# Patient Record
Sex: FEMALE | Race: WHITE | URBAN - NONMETROPOLITAN AREA
[De-identification: names, ages, dates, MRNs, and addresses within clinical notes are randomized per-mention and may not be internally consistent; named-entity substitution may affect disease eponyms.]

---

## 2024-04-22 ENCOUNTER — NURSE ONLY (OUTPATIENT)
Age: 59
End: 2024-04-22

## 2024-04-22 LAB
ALBUMIN SERPL BCG-MCNC: 4.9 G/DL (ref 3.5–5.1)
ALP SERPL-CCNC: 108 U/L (ref 38–126)
ALT SERPL W/O P-5'-P-CCNC: 35 U/L (ref 11–66)
ANION GAP SERPL CALC-SCNC: 14 MEQ/L (ref 8–16)
AST SERPL-CCNC: 26 U/L (ref 5–40)
BASOPHILS ABSOLUTE: 0 THOU/MM3 (ref 0–0.1)
BASOPHILS NFR BLD AUTO: 0.4 %
BILIRUB SERPL-MCNC: 0.3 MG/DL (ref 0.3–1.2)
BUN SERPL-MCNC: 11 MG/DL (ref 7–22)
CALCIUM SERPL-MCNC: 9.4 MG/DL (ref 8.5–10.5)
CHLORIDE SERPL-SCNC: 103 MEQ/L (ref 98–111)
CHOLEST SERPL-MCNC: 242 MG/DL (ref 100–199)
CO2 SERPL-SCNC: 26 MEQ/L (ref 23–33)
CREAT SERPL-MCNC: 0.6 MG/DL (ref 0.4–1.2)
DEPRECATED RDW RBC AUTO: 41.7 FL (ref 35–45)
EOSINOPHIL NFR BLD AUTO: 2.7 %
EOSINOPHILS ABSOLUTE: 0.2 THOU/MM3 (ref 0–0.4)
ERYTHROCYTE [DISTWIDTH] IN BLOOD BY AUTOMATED COUNT: 12.7 % (ref 11.5–14.5)
GFR SERPL CREATININE-BSD FRML MDRD: > 90 ML/MIN/1.73M2
GLUCOSE SERPL-MCNC: 119 MG/DL (ref 70–108)
HCT VFR BLD AUTO: 43.3 % (ref 37–47)
HDLC SERPL-MCNC: 46 MG/DL
HGB BLD-MCNC: 14.3 GM/DL (ref 12–16)
IMM GRANULOCYTES # BLD AUTO: 0.02 THOU/MM3 (ref 0–0.07)
IMM GRANULOCYTES NFR BLD AUTO: 0.3 %
LDLC SERPL CALC-MCNC: 154 MG/DL
LYMPHOCYTES ABSOLUTE: 2.5 THOU/MM3 (ref 1–4.8)
LYMPHOCYTES NFR BLD AUTO: 32.6 %
MCH RBC QN AUTO: 30.1 PG (ref 26–33)
MCHC RBC AUTO-ENTMCNC: 33 GM/DL (ref 32.2–35.5)
MCV RBC AUTO: 91.2 FL (ref 81–99)
MONOCYTES ABSOLUTE: 0.6 THOU/MM3 (ref 0.4–1.3)
MONOCYTES NFR BLD AUTO: 7.2 %
NEUTROPHILS NFR BLD AUTO: 56.8 %
NRBC BLD AUTO-RTO: 0 /100 WBC
PLATELET # BLD AUTO: 300 THOU/MM3 (ref 130–400)
PMV BLD AUTO: 9.9 FL (ref 9.4–12.4)
POTASSIUM SERPL-SCNC: 4 MEQ/L (ref 3.5–5.2)
PROT SERPL-MCNC: 7.6 G/DL (ref 6.1–8)
RBC # BLD AUTO: 4.75 MILL/MM3 (ref 4.2–5.4)
SEGMENTED NEUTROPHILS ABSOLUTE COUNT: 4.4 THOU/MM3 (ref 1.8–7.7)
SODIUM SERPL-SCNC: 143 MEQ/L (ref 135–145)
TRIGL SERPL-MCNC: 211 MG/DL (ref 0–199)
TSH SERPL DL<=0.005 MIU/L-ACNC: 1.79 UIU/ML (ref 0.4–4.2)
WBC # BLD AUTO: 7.7 THOU/MM3 (ref 4.8–10.8)

## 2024-04-24 LAB
DEPRECATED MEAN GLUCOSE BLD GHB EST-ACNC: 114 MG/DL (ref 70–126)
HBA1C MFR BLD HPLC: 5.8 % (ref 4.4–6.4)

## 2024-09-03 ENCOUNTER — LAB (OUTPATIENT)
Age: 59
End: 2024-09-03

## 2024-09-03 LAB
CRP SERPL-MCNC: 0.47 MG/DL (ref 0–1)
ERYTHROCYTE [SEDIMENTATION RATE] IN BLOOD BY WESTERGREN METHOD: 50 MM/HR (ref 0–20)
T4 FREE SERPL-MCNC: 1.73 NG/DL (ref 0.93–1.68)
TSH SERPL DL<=0.005 MIU/L-ACNC: < 0.005 UIU/ML (ref 0.4–4.2)

## 2024-09-06 LAB — THYROPEROXIDASE AB SERPL IA-ACNC: 12 IU/ML (ref 0–25)

## 2024-09-18 ENCOUNTER — LAB (OUTPATIENT)
Age: 59
End: 2024-09-18

## 2024-09-20 LAB — THYROPEROXIDASE AB SERPL IA-ACNC: 13 IU/ML (ref 0–25)

## 2024-11-21 ENCOUNTER — LAB (OUTPATIENT)
Age: 59
End: 2024-11-21

## 2024-11-21 LAB
BASOPHILS ABSOLUTE: 0 THOU/MM3 (ref 0–0.1)
BASOPHILS NFR BLD AUTO: 0.3 %
CRP SERPL-MCNC: < 0.3 MG/DL (ref 0–1)
DEPRECATED RDW RBC AUTO: 43.9 FL (ref 35–45)
EOSINOPHIL NFR BLD AUTO: 2 %
EOSINOPHILS ABSOLUTE: 0.2 THOU/MM3 (ref 0–0.4)
ERYTHROCYTE [DISTWIDTH] IN BLOOD BY AUTOMATED COUNT: 13.6 % (ref 11.5–14.5)
HCT VFR BLD AUTO: 43.1 % (ref 37–47)
HGB BLD-MCNC: 14.1 GM/DL (ref 12–16)
IMM GRANULOCYTES # BLD AUTO: 0.03 THOU/MM3 (ref 0–0.07)
IMM GRANULOCYTES NFR BLD AUTO: 0.3 %
LYMPHOCYTES ABSOLUTE: 3.2 THOU/MM3 (ref 1–4.8)
LYMPHOCYTES NFR BLD AUTO: 33.9 %
MCH RBC QN AUTO: 29.1 PG (ref 26–33)
MCHC RBC AUTO-ENTMCNC: 32.7 GM/DL (ref 32.2–35.5)
MCV RBC AUTO: 89 FL (ref 81–99)
MONOCYTES ABSOLUTE: 0.6 THOU/MM3 (ref 0.4–1.3)
MONOCYTES NFR BLD AUTO: 6 %
NEUTROPHILS ABSOLUTE: 5.3 THOU/MM3 (ref 1.8–7.7)
NEUTROPHILS NFR BLD AUTO: 57.5 %
NRBC BLD AUTO-RTO: 0 /100 WBC
PLATELET # BLD AUTO: 326 THOU/MM3 (ref 130–400)
PMV BLD AUTO: 10.3 FL (ref 9.4–12.4)
RBC # BLD AUTO: 4.84 MILL/MM3 (ref 4.2–5.4)
T3 TOTAL: 155 NG/DL (ref 80–200)
T3FREE SERPL-MCNC: 3.3 PG/ML (ref 2–4.4)
T4 FREE SERPL-MCNC: 0.79 NG/DL (ref 0.93–1.68)
T4 SERPL-MCNC: 6.9 UG/DL (ref 4.5–11.7)
TSH SERPL DL<=0.005 MIU/L-ACNC: 1.76 UIU/ML (ref 0.4–4.2)
WBC # BLD AUTO: 9.3 THOU/MM3 (ref 4.8–10.8)

## 2024-12-02 ENCOUNTER — OFFICE VISIT (OUTPATIENT)
Age: 59
End: 2024-12-02
Payer: COMMERCIAL

## 2024-12-02 VITALS
WEIGHT: 172.8 LBS | BODY MASS INDEX: 30.62 KG/M2 | HEART RATE: 89 BPM | SYSTOLIC BLOOD PRESSURE: 142 MMHG | HEIGHT: 63 IN | DIASTOLIC BLOOD PRESSURE: 86 MMHG

## 2024-12-02 DIAGNOSIS — R94.6 ABNORMAL THYROID FUNCTION TEST: Primary | ICD-10-CM

## 2024-12-02 DIAGNOSIS — E23.6 EMPTY SELLA (HCC): ICD-10-CM

## 2024-12-02 DIAGNOSIS — E05.00 GRAVES DISEASE: ICD-10-CM

## 2024-12-02 PROCEDURE — 99204 OFFICE O/P NEW MOD 45 MIN: CPT | Performed by: INTERNAL MEDICINE

## 2024-12-02 RX ORDER — METOPROLOL SUCCINATE 25 MG/1
25 TABLET, EXTENDED RELEASE ORAL DAILY
COMMUNITY
Start: 2024-11-06

## 2024-12-02 RX ORDER — METHIMAZOLE 5 MG/1
5 TABLET ORAL DAILY
COMMUNITY
Start: 2024-10-31

## 2024-12-02 RX ORDER — AMITRIPTYLINE HYDROCHLORIDE 10 MG/1
TABLET ORAL
COMMUNITY
Start: 2024-11-20

## 2024-12-02 NOTE — PROGRESS NOTES
[facility] [place] [facility]  [facility] ENDOCRINOLOGY  [address]  [place] [id number]  Dept: [phone]  Loc: [phone]     Visit Date: 2024    Gianna Boland is a 59 y.o. female who presents today for:  Chief Complaint   Patient presents with    Hyperthyroidism            Subjective:  History of Present Illness       Gianna Boland is a 59 y.o. , female who comes for initial visit for abnormal thyroid function test.    Lopez Kramer MD  Gianna Boland was diagnosed with abnormal thyroid function tests 5 months ago consisting of low TSH and elevated free T4.  She subsequently underwent additional testing which showed elevated thyroid-stimulating immunoglobulin, consistent then with a diagnosis of Graves' disease.  The patient was placed on Tapazole 5 mg daily and repeat testing 3 weeks ago showed normalization of TSH, low free T4 and a normal free T3.  The patient had a thyroid scan with uptake on 2024 with a 24-hour uptake coming back slightly elevated at 28% but there was normal distribution of the radiopharmaceutical agent in the thyroid gland.  She had an ultrasound of the thyroid gland 2024 with the finding of a 4.7 x 2.7 x 1.7 cm nodule in the right lobe.  Of note, the patient has been having right ear pain which radiates up the scalp and to work this up she had an MRI of the head on 2024.  This study showed partially empty sella which has not been worked up.  Past Medical History:   Diagnosis Date    MVP (mitral valve prolapse)       Past Surgical History:   Procedure Laterality Date     SECTION  ,     DILATION AND CURETTAGE OF UTERUS  11    ENDOMETRIAL ABLATION  11    OVARIAN CYST REMOVAL  1985    TONSILLECTOMY  1970       Family History   Problem Relation Age of Onset    High Blood Pressure Mother     High Blood Pressure Father      Social History     Tobacco Use    Smoking status: Never    Smokeless tobacco: Never

## 2024-12-03 ENCOUNTER — LAB (OUTPATIENT)
Age: 59
End: 2024-12-03

## 2024-12-03 DIAGNOSIS — E23.6 EMPTY SELLA (HCC): ICD-10-CM

## 2024-12-03 DIAGNOSIS — R94.6 ABNORMAL THYROID FUNCTION TEST: ICD-10-CM

## 2024-12-03 LAB
CORTIS SERPL-MCNC: 9.35 UG/DL
CORTISOL COLLECTION INFO: NORMAL
FOLLICLE STIMULATING HORMONE: 62.9 MIU/ML
LUTEINIZING HORMONE: 39.3 MIU/ML (ref 1.7–8.6)
PROLACTIN SERPL-MCNC: 8.7 NG/ML
T3FREE SERPL-MCNC: 3.8 PG/ML (ref 2–4.4)
T4 FREE SERPL-MCNC: 0.89 NG/DL (ref 0.93–1.68)
TSH SERPL DL<=0.005 MIU/L-ACNC: 2.25 UIU/ML (ref 0.4–4.2)

## 2024-12-04 LAB — ACTH PLAS-MCNC: 9.6 PG/ML (ref 7.2–63.3)

## 2024-12-05 LAB
IGF-I SERPL-MCNC: 173 NG/ML (ref 43–241)
IGF-I Z-SCORE SERPL: 1
TSI SER-ACNC: 0.23 IU/L

## 2024-12-09 ENCOUNTER — TELEPHONE (OUTPATIENT)
Age: 59
End: 2024-12-09

## 2024-12-09 NOTE — TELEPHONE ENCOUNTER
----- Message from Dr. Navin Cook MD sent at 12/7/2024  4:11 PM EST -----  Acceptable thyroid labs.  Get with me to perform a formal ACTH stimulation test.

## 2024-12-19 ENCOUNTER — LAB (OUTPATIENT)
Age: 59
End: 2024-12-19

## 2024-12-19 LAB
CORTIS SERPL-MCNC: 12.28 UG/DL
CORTISOL COLLECTION INFO: NORMAL

## 2024-12-21 LAB
IGF-I SERPL-MCNC: 140 NG/ML (ref 43–241)
IGF-I Z-SCORE SERPL: 0.5

## 2024-12-26 ENCOUNTER — TELEPHONE (OUTPATIENT)
Age: 59
End: 2024-12-26

## 2024-12-26 NOTE — TELEPHONE ENCOUNTER
----- Message from Dr. Navin Cook MD sent at 12/22/2024  4:19 PM EST -----  IGF-I is normal, implying that the growth hormone axis is normal.  Cortisol level is indeterminate.  Lets wait for the final ACTH stimulation test.

## 2024-12-31 ENCOUNTER — HOSPITAL ENCOUNTER (OUTPATIENT)
Dept: NURSING | Age: 59
Discharge: HOME | End: 2024-12-31
Payer: COMMERCIAL

## 2024-12-31 VITALS
TEMPERATURE: 97 F | RESPIRATION RATE: 18 BRPM | HEART RATE: 73 BPM | HEIGHT: 63 IN | OXYGEN SATURATION: 98 % | DIASTOLIC BLOOD PRESSURE: 80 MMHG | BODY MASS INDEX: 30.48 KG/M2 | SYSTOLIC BLOOD PRESSURE: 162 MMHG | WEIGHT: 172 LBS

## 2024-12-31 LAB
CORTIS SERPL-MCNC: 22.55 UG/DL
CORTIS SERPL-MCNC: 25.94 UG/DL
CORTIS SERPL-MCNC: 9.89 UG/DL
CORTISOL COLLECTION INFO: NORMAL

## 2024-12-31 PROCEDURE — 82024 ASSAY OF ACTH: CPT

## 2024-12-31 PROCEDURE — 96374 THER/PROPH/DIAG INJ IV PUSH: CPT

## 2024-12-31 PROCEDURE — 82533 TOTAL CORTISOL: CPT

## 2024-12-31 PROCEDURE — 6360000002 HC RX W HCPCS: Performed by: INTERNAL MEDICINE

## 2024-12-31 PROCEDURE — 99213 OFFICE O/P EST LOW 20 MIN: CPT

## 2024-12-31 PROCEDURE — 80400 ACTH STIMULATION PANEL: CPT

## 2024-12-31 RX ORDER — COSYNTROPIN 0.25 MG/ML
0.25 INJECTION, POWDER, FOR SOLUTION INTRAMUSCULAR; INTRAVENOUS ONCE
Status: COMPLETED | OUTPATIENT
Start: 2024-12-31 | End: 2024-12-31

## 2024-12-31 RX ADMIN — COSYNTROPIN 0.25 MG: 0.25 INJECTION, POWDER, LYOPHILIZED, FOR SOLUTION INTRAMUSCULAR; INTRAVENOUS at 08:00

## 2024-12-31 ASSESSMENT — PAIN - FUNCTIONAL ASSESSMENT: PAIN_FUNCTIONAL_ASSESSMENT: NONE - DENIES PAIN

## 2024-12-31 NOTE — PROGRESS NOTES
0721 Patient arrived to [facility] ambulatory for stimulation test.  Oriented to room and call light  PT RIGHTS AND RESPONSIBILITIES OFFERED TO PT.    0748 baseline blood work obtained and sent to lab     0800 Cortrosyn given and she denies complaints     0830 30 min post blood work obtained and sent to lab     0900 60 min post blood work obtained and sent to lab     0918 iv removed.  Discharge instructions given and explained and she denies questions.  Discharged ambulatory      _M___ Safety:       (Environmental)  [place] to environment  Ensure ID band is correct and in place/ allergy band as needed  Assess for fall risk  Initiate fall precautions as applicable (fall band, side rails, etc.)  Call light within reach  Bed in low position/ wheels locked    _M___ Pain:       Assess pain level and characteristics  Administer analgesics as ordered  Assess effectiveness of pain management and report to MD as needed    _M___ Knowledge Deficit:  Assess baseline knowledge  Provide teaching at level of understanding  Provide teaching via preferred learning method  Evaluate teaching effectiveness    _M___ Hemodynamic/Respiratory Status:       (Pre and Post Procedure Monitoring)  Assess/Monitor vital signs and LOC  Assess Baseline SpO2 prior to any sedation  Obtain weight/height  Assess vital signs/ LOC until patient meets discharge criteria  Monitor procedure site and notify MD of any issues

## 2025-01-01 LAB — ACTH PLAS-MCNC: 8 PG/ML (ref 7.2–63.3)

## 2025-02-13 ENCOUNTER — CLINICAL DOCUMENTATION (OUTPATIENT)
Age: 60
End: 2025-02-13

## 2025-02-13 DIAGNOSIS — R94.6 ABNORMAL THYROID FUNCTION TEST: Primary | ICD-10-CM

## 2025-02-17 ENCOUNTER — LAB (OUTPATIENT)
Age: 60
End: 2025-02-17

## 2025-02-17 DIAGNOSIS — R94.6 ABNORMAL THYROID FUNCTION TEST: ICD-10-CM

## 2025-02-17 LAB
T3FREE SERPL-MCNC: 3.57 PG/ML (ref 2–4.4)
T4 FREE SERPL-MCNC: 0.99 NG/DL (ref 0.92–1.68)
TSH SERPL DL<=0.005 MIU/L-ACNC: 3.1 UIU/ML (ref 0.4–4.2)

## 2025-03-05 ENCOUNTER — OFFICE VISIT (OUTPATIENT)
Age: 60
End: 2025-03-05
Payer: COMMERCIAL

## 2025-03-05 VITALS
HEIGHT: 63 IN | DIASTOLIC BLOOD PRESSURE: 102 MMHG | RESPIRATION RATE: 18 BRPM | SYSTOLIC BLOOD PRESSURE: 162 MMHG | WEIGHT: 172.4 LBS | BODY MASS INDEX: 30.55 KG/M2 | HEART RATE: 78 BPM

## 2025-03-05 DIAGNOSIS — E23.6 EMPTY SELLA: ICD-10-CM

## 2025-03-05 DIAGNOSIS — E05.90 HYPERTHYROIDISM: ICD-10-CM

## 2025-03-05 DIAGNOSIS — R03.0 ELEVATED BLOOD PRESSURE READING: Primary | ICD-10-CM

## 2025-03-05 DIAGNOSIS — E05.00 GRAVES DISEASE: ICD-10-CM

## 2025-03-05 PROCEDURE — 99215 OFFICE O/P EST HI 40 MIN: CPT | Performed by: INTERNAL MEDICINE

## 2025-03-05 RX ORDER — METOPROLOL SUCCINATE 25 MG/1
TABLET, EXTENDED RELEASE ORAL
Qty: 7 TABLET | Refills: 0 | Status: SHIPPED | OUTPATIENT
Start: 2025-03-05

## 2025-03-05 NOTE — PROGRESS NOTES
[facility] SPECIALTY  [facility] ENDOCRINOLOGY  [address]  [facility] [address]  Dept: [phone]  Loc: [phone]     Visit Date: 3/5/2025  The patient (or guardian, if applicable) and other individuals in attendance with the patient were advised that Artificial Intelligence will be utilized during this visit to record, process the conversation to generate a clinical note, and support improvement of the AI technology. The patient (or guardian, if applicable) and other individuals in attendance at the appointment consented to the use of AI, including the recording.      Gianna Boland is a 59 y.o. female who presents today for:  Chief Complaint   Patient presents with    Follow-up     Abnormal thyroid function test             Subjective:  History of Present Illness  The patient presents for evaluation of Graves' disease and elevated blood pressure.    She reports a perceived weight gain, persistent dry skin, and continued hair thinning. She does not experience heat intolerance, palpitations, tremors, or anxiety. Occasional awakenings at 3:00 or 4:00 AM are noted, after which she is not able to return to sleep. Despite an improvement in fatigue since her last visit, she still feels tired. She has a history of a small thyroid nodule, which has remained stable over the past 3 years. An ultrasound conducted this summer revealed no changes compared to the one performed 3 years ago. A previous nodule on the left side has resolved. She is currently on a daily regimen of methimazole 5 mg, having discontinued metoprolol. Her initial consultation was in 12/2024, following an abnormal thyroid level detected in 08/2024, with a T4 level of 1.78. She experienced palpitations in 08/2024, but these have since resolved.  She has been diagnosed with dry eyes by her ophthalmologist, who prescribed Systane drops and a supplement containing omega-3 and vitamins. This treatment has improved

## 2025-03-26 LAB
A/G RATIO: 1.8
ALBUMIN: 4.9 G/DL
ALP BLD-CCNC: 116 U/L
ALT SERPL-CCNC: 24 U/L
AST SERPL-CCNC: 22 U/L
BASOPHILS ABSOLUTE: 29 /ΜL
BASOPHILS RELATIVE PERCENT: 0.4 %
BILIRUB SERPL-MCNC: 0.5 MG/DL (ref 0.1–1.4)
BILIRUBIN DIRECT: 0.1 MG/DL
BILIRUBIN, INDIRECT: 0.4
EOSINOPHILS ABSOLUTE: 151 /ΜL
EOSINOPHILS RELATIVE PERCENT: 2.1 %
GLOBULIN: 2.7
HCT VFR BLD CALC: 41.7 % (ref 36–46)
HEMOGLOBIN: 14.1 G/DL (ref 12–16)
LYMPHOCYTES ABSOLUTE: 2477 /ΜL
LYMPHOCYTES RELATIVE PERCENT: 34.4 %
MCH RBC QN AUTO: 29.5 PG
MCHC RBC AUTO-ENTMCNC: 33.8 G/DL
MCV RBC AUTO: 87.2 FL
MONOCYTES ABSOLUTE: 425 /ΜL
MONOCYTES RELATIVE PERCENT: 5.9 %
NEUTROPHILS ABSOLUTE: 4118 /ΜL
NEUTROPHILS RELATIVE PERCENT: 57.2 %
PDW BLD-RTO: 12.5 %
PLATELET # BLD: 324 K/ΜL
PMV BLD AUTO: 9.8 FL
RBC # BLD: 4.78 10^6/ΜL
T3 FREE: 3.1
T4 FREE: 1.1
TOTAL PROTEIN: 7.6 G/DL (ref 6.4–8.2)
TSH SERPL DL<=0.05 MIU/L-ACNC: 2.41 UIU/ML
WBC # BLD: 7.2 10^3/ML

## 2025-03-28 ENCOUNTER — CLINICAL DOCUMENTATION (OUTPATIENT)
Age: 60
End: 2025-03-28

## 2025-03-28 DIAGNOSIS — E05.90 HYPERTHYROIDISM: Primary | ICD-10-CM

## 2025-03-28 NOTE — PROGRESS NOTES
Everything was normal.  Change Tapazole to 1 tablet every day from Monday to Saturday, and skip on Sunday.  Get free T4, free T3 and TSH in 6 weeks.  Ordered.

## 2025-03-29 ENCOUNTER — RESULTS FOLLOW-UP (OUTPATIENT)
Age: 60
End: 2025-03-29

## 2025-04-08 LAB
T3 FREE: 3.6
T4 FREE: 1.2
TSH SERPL DL<=0.05 MIU/L-ACNC: 2.11 UIU/ML

## 2025-04-12 ENCOUNTER — RESULTS FOLLOW-UP (OUTPATIENT)
Age: 60
End: 2025-04-12

## 2025-06-10 LAB
T3 FREE: 3.1
T4 FREE: 1.1
TSH SERPL DL<=0.05 MIU/L-ACNC: 1.97 UIU/ML

## 2025-06-16 ENCOUNTER — OFFICE VISIT (OUTPATIENT)
Age: 60
End: 2025-06-16
Payer: COMMERCIAL

## 2025-06-16 VITALS
HEIGHT: 63 IN | HEART RATE: 83 BPM | BODY MASS INDEX: 30.12 KG/M2 | SYSTOLIC BLOOD PRESSURE: 130 MMHG | WEIGHT: 170 LBS | DIASTOLIC BLOOD PRESSURE: 86 MMHG

## 2025-06-16 DIAGNOSIS — E05.00 GRAVES DISEASE: ICD-10-CM

## 2025-06-16 DIAGNOSIS — E05.90 HYPERTHYROIDISM: Primary | ICD-10-CM

## 2025-06-16 PROCEDURE — 99214 OFFICE O/P EST MOD 30 MIN: CPT | Performed by: INTERNAL MEDICINE

## 2025-06-16 RX ORDER — METHIMAZOLE 5 MG/1
TABLET ORAL
Qty: 20 TABLET | Refills: 3 | Status: SHIPPED | OUTPATIENT
Start: 2025-06-16

## 2025-06-16 RX ORDER — CIPROFLOXACIN AND DEXAMETHASONE 3; 1 MG/ML; MG/ML
SUSPENSION/ DROPS AURICULAR (OTIC)
COMMUNITY
Start: 2025-06-12

## 2025-06-16 RX ORDER — TRETINOIN 0.25 MG/G
CREAM TOPICAL
COMMUNITY
Start: 2025-06-04

## 2025-06-16 RX ORDER — FEXOFENADINE HCL 60 MG/1
60 TABLET, FILM COATED ORAL
COMMUNITY

## 2025-06-16 RX ORDER — CEFDINIR 300 MG/1
CAPSULE ORAL
COMMUNITY
Start: 2025-06-12

## 2025-06-16 RX ORDER — CALCIUM CARBONATE/VITAMIN D3 500 MG-10
TABLET,CHEWABLE ORAL
COMMUNITY

## 2025-06-16 NOTE — PROGRESS NOTES
[facility] [place] [facility]  [facility] ENDOCRINOLOGY  [address]  [address]  [facility] [address]  Dept: [phone]  Loc: [phone]     Visit Date: 2025    Gianna Boland is a 59 y.o. female who presents today for:  Chief Complaint   Patient presents with    Follow-up            Subjective:  History of Present Illness       Gianna Boland is a 59 y.o. , female who comes for follow up .    Stephanie Amaral RN  Gianna Boland was diagnosed with hyperthyroidism last year  Underlying cause of hyperthyroidism is Grave's Disease.   Current therapy includes Tapazole 5 mg 6 days a week.    Recent labs showed normal thyroid labs from last week.  Symptoms are as follows: Insomnia and Fatigue.    There is no thyroid pain, choking or hoarseness of the voice.    The patient reports no problems with Her eyes, and She has not noticed any skin lesions recently.    Past Medical History:   Diagnosis Date    MVP (mitral valve prolapse)       Past Surgical History:   Procedure Laterality Date     SECTION  ,     DILATION AND CURETTAGE OF UTERUS  11    ENDOMETRIAL ABLATION  11    OVARIAN CYST REMOVAL      TONSILLECTOMY  1970       Family History   Problem Relation Age of Onset    High Blood Pressure Mother     High Blood Pressure Father      Social History     Tobacco Use    Smoking status: Never    Smokeless tobacco: Never   Substance Use Topics    Alcohol use: No      Current Outpatient Medications   Medication Sig Dispense Refill    cefdinir (OMNICEF) 300 MG capsule TAKE 1 CAPSULE (300 MG TOTAL) BY MOUTH TWICE A DAY FOR 10 DAYS      tretinoin (RETIN-A) 0.025 % cream APPLY TO AFFECTED AREAS ON FACE EVERY NIGHT AS TOLERATED      LUTEIN PO Take 1 tablet by mouth daily      fexofenadine (ALLEGRA) 60 MG tablet Take 1 tablet by mouth      Coenzyme Q10 10 MG CAPS Take by mouth      Calcium Carb-Cholecalciferol 500-10 MG-MCG CHEW Take by mouth      ciprofloxacin-dexAMETHasone

## 2025-06-27 ENCOUNTER — RESULTS FOLLOW-UP (OUTPATIENT)
Age: 60
End: 2025-06-27

## 2025-07-22 LAB
T3 FREE: 3.1
T4 FREE: 1.1
TSH SERPL DL<=0.05 MIU/L-ACNC: 1.5 UIU/ML

## 2025-08-06 ENCOUNTER — CLINICAL DOCUMENTATION (OUTPATIENT)
Age: 60
End: 2025-08-06

## 2025-08-06 DIAGNOSIS — E05.90 HYPERTHYROIDISM: Primary | ICD-10-CM
